# Patient Record
Sex: MALE | Race: BLACK OR AFRICAN AMERICAN | Employment: PART TIME | URBAN - METROPOLITAN AREA
[De-identification: names, ages, dates, MRNs, and addresses within clinical notes are randomized per-mention and may not be internally consistent; named-entity substitution may affect disease eponyms.]

---

## 2020-03-19 ENCOUNTER — OFFICE VISIT (OUTPATIENT)
Dept: URGENT CARE | Age: 70
End: 2020-03-19

## 2020-03-19 ENCOUNTER — NURSE TRIAGE (OUTPATIENT)
Dept: OTHER | Facility: CLINIC | Age: 70
End: 2020-03-19

## 2020-03-19 VITALS
TEMPERATURE: 97.6 F | WEIGHT: 171 LBS | BODY MASS INDEX: 23.16 KG/M2 | RESPIRATION RATE: 16 BRPM | SYSTOLIC BLOOD PRESSURE: 118 MMHG | DIASTOLIC BLOOD PRESSURE: 75 MMHG | HEIGHT: 72 IN | OXYGEN SATURATION: 99 % | HEART RATE: 54 BPM

## 2020-03-19 DIAGNOSIS — R53.83 FATIGUE, UNSPECIFIED TYPE: ICD-10-CM

## 2020-03-19 DIAGNOSIS — R06.02 SOB (SHORTNESS OF BREATH): ICD-10-CM

## 2020-03-19 DIAGNOSIS — J02.9 SORE THROAT: ICD-10-CM

## 2020-03-19 DIAGNOSIS — J06.9 VIRAL UPPER RESPIRATORY INFECTION: Primary | ICD-10-CM

## 2020-03-19 NOTE — PROGRESS NOTES
Cold Symptoms   The history is provided by the patient. This is a new problem. Episode onset: 2 weeks ago; works as a  - reports has been in contact with \"many\" people. The problem occurs constantly. The problem has not changed since onset. There has been no fever. Associated symptoms include rhinorrhea, sore throat and shortness of breath. Pertinent negatives include no chest pain, no chills, no sweats, no ear congestion, no ear pain, no myalgias and no wheezing. He has tried cough syrup (PCP called in [address] 2 days ago) for the symptoms. The treatment provided mild relief. He is not a smoker.         Past Medical History:   Diagnosis Date    [facility])     colon cancer    H/O blood clots 2014        Past Surgical History:   Procedure Laterality Date    HX OTHER SURGICAL      small bowel obstruction         Family History   Problem Relation Age of Onset    Hypertension Mother         Social History     Socioeconomic History    Marital status:      Spouse name: Not on file    Number of children: Not on file    Years of education: Not on file    Highest education level: Not on file   Occupational History    Not on file   Social Needs    Financial resource strain: Not on file    Food insecurity     Worry: Not on file     Inability: Not on file    Transportation needs     Medical: Not on file     Non-medical: Not on file   Tobacco Use    Smoking status: Never Smoker    Smokeless tobacco: Never Used   Substance and Sexual Activity    Alcohol use: No    Drug use: No    Sexual activity: Not Currently   Lifestyle    Physical activity     Days per week: Not on file     Minutes per session: Not on file    Stress: Not on file   Relationships    Social connections     Talks on phone: Not on file     Gets together: Not on file     Attends Alevism service: Not on file     Active member of club or organization: Not on file     Attends meetings of clubs or organizations: Not on file Relationship status: Not on file    Intimate partner violence     Fear of current or ex partner: Not on file     Emotionally abused: Not on file     Physically abused: Not on file     Forced sexual activity: Not on file   Other Topics Concern    Not on file   Social History Narrative    Not on file                ALLERGIES: Patient has no known allergies. Review of Systems   Constitutional: Negative for activity change, appetite change, chills and fever. HENT: Positive for congestion, rhinorrhea and sore throat. Negative for ear pain. Respiratory: Positive for shortness of breath. Negative for cough and wheezing. Cardiovascular: Negative for chest pain and palpitations. Musculoskeletal: Negative for myalgias. Skin: Negative for rash. Hematological: Negative for adenopathy. Vitals:    03/19/20 1632   BP: 118/75   Pulse: (!) 54   Resp: 16   Temp: 97.6 °F (36.4 °C)   SpO2: 99%   Weight: 171 lb (77.6 kg)   Height: 6' (1.829 m)       Physical Exam  Vitals signs and nursing note reviewed. Constitutional:       General: He is not in acute distress. Appearance: He is well-developed. He is not diaphoretic. HENT:      Right Ear: Tympanic membrane, ear canal and external ear normal.      Left Ear: Tympanic membrane, ear canal and external ear normal.      Nose: Congestion present. Right Sinus: No maxillary sinus tenderness or frontal sinus tenderness. Left Sinus: No maxillary sinus tenderness or frontal sinus tenderness. Mouth/Throat:      Pharynx: No oropharyngeal exudate or posterior oropharyngeal erythema. Tonsils: No tonsillar abscesses. Cardiovascular:      Rate and Rhythm: Normal rate and regular rhythm. Heart sounds: Normal heart sounds. Pulmonary:      Effort: Pulmonary effort is normal. No respiratory distress. Breath sounds: Normal breath sounds. No wheezing or rales. Lymphadenopathy:      Cervical: No cervical adenopathy.    Neurological: Mental Status: He is alert. Psychiatric:         Behavior: Behavior normal.         Thought Content: Thought content normal.         Judgment: Judgment normal.         MDM    ICD-10-CM ICD-9-CM   1. Viral upper respiratory infection J06.9 465.9   2. Sore throat J02.9 462   3. SOB (shortness of breath) R06.02 786.05   4. Fatigue, unspecified type R53.83 780.79       Orders Placed This Encounter    XR CHEST PA LAT     Standing Status:   Future     Number of Occurrences:   1     Standing Expiration Date:   4/19/2021      Complete Zpak  The patient is to follow up with PCP as needed. If signs and symptoms become worse the pt is to go to the ER. XR Results (most recent):  Results from Appointment encounter on 03/19/20   XR CHEST PA LAT    Narrative Chest 2 views dated 3/19/2020    History is shortness of breath/history of colon cancer    PA and lateral views of the chest were obtained. The cardiac silhouette is  normal in size. There is hyperaeration of the lungs. No areas of lobar  consolidation are identified. No focal pulmonary nodules/mass lesions are  identified. Impression IMPRESSION: No evidence of active lung disease.        Procedures

## 2020-03-19 NOTE — TELEPHONE ENCOUNTER
Reason for Disposition   [1] MILD difficulty breathing (e.g., minimal/no SOB at rest, SOB with walking, pulse <100) AND [2] NEW-onset or WORSE than normal    Protocols used: BREATHING DIFFICULTY-ADULT-AH    Wife is reporting that  has a sore throat, headache and feels SOB at time. Provider started patient on antibiotic and patient has not improved. Provided local flu clinic to patient as well as home care recommendations to help with symptoms.